# Patient Record
Sex: FEMALE | Race: AMERICAN INDIAN OR ALASKA NATIVE
[De-identification: names, ages, dates, MRNs, and addresses within clinical notes are randomized per-mention and may not be internally consistent; named-entity substitution may affect disease eponyms.]

---

## 2018-06-02 ENCOUNTER — HOSPITAL ENCOUNTER (EMERGENCY)
Dept: HOSPITAL 14 - H.ER | Age: 43
Discharge: HOME | End: 2018-06-02
Payer: COMMERCIAL

## 2018-06-02 VITALS
DIASTOLIC BLOOD PRESSURE: 75 MMHG | SYSTOLIC BLOOD PRESSURE: 118 MMHG | OXYGEN SATURATION: 100 % | TEMPERATURE: 97 F | HEART RATE: 69 BPM

## 2018-06-02 VITALS — BODY MASS INDEX: 23.7 KG/M2

## 2018-06-02 DIAGNOSIS — N72: Primary | ICD-10-CM

## 2018-06-02 DIAGNOSIS — D25.9: ICD-10-CM

## 2018-06-02 DIAGNOSIS — N89.8: ICD-10-CM

## 2018-06-02 NOTE — ED PDOC
HPI: Female  Pain


Time Seen by Provider: 06/02/18 08:00


Chief Complaint (Nursing): Female Genitourinary


Chief Complaint (Provider): Female Genitourinary


History Per: Patient


History/Exam Limitations: no limitations


Onset/Duration Of Symptoms: Days (x7)


Current Symptoms Are (Timing): Still Present


Quality Of Discomfort: Pressure


Additional Complaint(s): 





43 year old female with no significant past medical history presents to the ED 

with clear vaginal discharge onset one week. She also reports lower abdominal 

pressure due to fibroids. Patient states last sexual activity was a year ago. 

She denies vaginal bleeding, dysuria, hematuria or any other medical complaints.








PMD:  none provided


Abnormal Vaginal Bleeding: No





Past Medical History


Reviewed: Historical Data, Nursing Documentation, Vital Signs


Vital Signs: 





 Last Vital Signs











Temp  97 F L  06/02/18 07:36


 


Pulse  69   06/02/18 07:36


 


Resp      


 


BP  118/75   06/02/18 07:36


 


Pulse Ox  100   06/02/18 07:36














- Medical History


PMH: No Chronic Diseases





- Surgical History


Surgical History: No Surg Hx





- Family History


Family History: States: Unknown Family Hx





- Home Medications


Home Medications: 


 Ambulatory Orders











 Medication  Instructions  Recorded


 


Gemifloxacin Mesylate [Factive] 320 mg PO DAILY #1 tablet 06/02/18














- Allergies


Allergies/Adverse Reactions: 


 Allergies











Allergy/AdvReac Type Severity Reaction Status Date / Time


 


No Known Allergies Allergy   Verified 06/02/18 07:57














Review of Systems


ROS Statement: Except As Marked, All Systems Reviewed And Found Negative


Gastrointestinal: Positive for: Other (lower abdominal pressure)


Genitourinary Female: Positive for: Vaginal Discharge (clear), Other.  Negative 

for: Dysuria, Hematuria, Vaginal Bleeding





Physical Exam





- Reviewed


Nursing Documentation Reviewed: Yes


Vital Signs Reviewed: Yes





- Physical Exam


Appears: Positive for: Non-toxic, No Acute Distress


Head Exam: Positive for: ATRAUMATIC, NORMOCEPHALIC


Skin: Positive for: Normal Color, Warm, Dry


Eye Exam: Positive for: EOMI, Normal appearance, PERRL


Neck: Positive for: Normal, Painless ROM


Cardiovascular/Chest: Positive for: Regular Rate, Rhythm


Respiratory: Positive for: Normal Breath Sounds.  Negative for: Respiratory 

Distress


Pelvic Exam: Positive for: External Exam Normal, Discharge (thick clear d/c on 

vaginal walls), Mass (suprapubic mass consistent with fibroids), Tender W/

Cervical Motion.  Negative for: No Cerv. Motion Tender, Blood, Lesions


Extremity: Positive for: Normal ROM (upper and lower extremities)


Neurologic/Psych: Positive for: Alert, Oriented (x3)





- ECG


O2 Sat by Pulse Oximetry: 100 (RA)


Pulse Ox Interpretation: Normal





Medical Decision Making


Medical Decision Making: 


Time: 


Initial Impression: Physiologic discharge


Differential diagnosis include but are not limited to cervicitis


Initial Plan: 


--Urine Preg


--Urine dip


--Chlamydia/GC RNA, TMA


--Rocephin 250 mg IM


--Zithromax 500 mg PO 


--Genital Culture











--------------------------------------------------------------------------------

-----------------


Scribe Attestation:


Documented by Genie Thomason, acting as a scribe for Krista Ku MD 





Provider Scribe Attestation:


All medical record entries made by the Scribe were at my direction and 

personally dictated by me. I have reviewed the chart and agree that the record 

accurately reflects my personal performance of the history, physical exam, 

medical decision making, and the department course for this patient. I have 

also personally directed, reviewed, and agree with the discharge instructions 

and disposition.





Disposition





- Clinical Impression


Clinical Impression: 


 Cervicitis








- Disposition


Disposition: Routine/Home


Disposition Time: 09:40


Condition: STABLE


Additional Instructions: 


FOLLOW-UP WITH OB-GYN WITHIN 2 DAYS FOR REEVALUATION.


Prescriptions: 


Gemifloxacin Mesylate [Factive] 320 mg PO DAILY #1 tablet


Instructions:  Vaginal Discharge in Adults


Forms:  [facility] (English)

## 2018-09-30 ENCOUNTER — HOSPITAL ENCOUNTER (EMERGENCY)
Dept: HOSPITAL 14 - H.ER | Age: 43
Discharge: HOME | End: 2018-09-30
Payer: COMMERCIAL

## 2018-09-30 VITALS
TEMPERATURE: 97.7 F | OXYGEN SATURATION: 100 % | DIASTOLIC BLOOD PRESSURE: 72 MMHG | HEART RATE: 67 BPM | SYSTOLIC BLOOD PRESSURE: 108 MMHG | RESPIRATION RATE: 18 BRPM

## 2018-09-30 VITALS — BODY MASS INDEX: 23.1 KG/M2

## 2018-09-30 DIAGNOSIS — N76.0: Primary | ICD-10-CM

## 2018-09-30 NOTE — ED PDOC
HPI: Female  Pain


Chief Complaint (Provider): Vaginal itching


History Per: Patient


Additional Complaint(s): 





44 yo female, no PMH, presents to ED for evaluation of vaginal itching and 

discharge x 1 week. Pt denies any recent history of unprotected sex, no concern 

for STDs. 





<Dora Bonilla - Last Filed: 09/30/18 11:01>





<Ela Zhou - Last Filed: 09/30/18 19:19>


Time Seen by Provider: 09/30/18 09:58


Chief Complaint (Nursing): Female Genitourinary





Supervising Attending Note





- Supervising Attending Note


The Documented history was done by the: Physician Extender


The documented physical exam was done by the: Physician Extender


The documented procedures were done by the: Physician Extender





- Attestation:


I have personally seen and examined this patient.: No


I have fully participated in the care of the patient.: No


I have reviewed all pertinent clinical information, including history, physical 

exam and plan: Yes





<Ela Zhou - Last Filed: 09/30/18 19:19>





Past Medical History


Reviewed: Nursing Documentation, Vital Signs


Vital Signs: 





                                Last Vital Signs











Temp  97.7 F   09/30/18 09:17


 


Pulse  67   09/30/18 09:17


 


Resp  18   09/30/18 09:17


 


BP  108/72   09/30/18 09:17


 


Pulse Ox  100   09/30/18 09:17














- Medical History


PMH: No Chronic Diseases





- Surgical History


Surgical History: No Surg Hx





- Family History


Family History: States: Unknown Family Hx





- Living Arrangements


Living Arrangements: With Family





- Social History


Current smoker - smoking cessation education provided: No


Alcohol: Social


Drugs: Denies





<Dora Bonilla - Last Filed: 09/30/18 11:01>


Vital Signs: 





                                Last Vital Signs











Temp  97.7 F   09/30/18 09:17


 


Pulse  67   09/30/18 09:17


 


Resp  18   09/30/18 09:17


 


BP  108/72   09/30/18 09:17


 


Pulse Ox  100   09/30/18 11:04














<Ela Zhou - Last Filed: 09/30/18 19:19>





- Home Medications


Home Medications: 


                                Ambulatory Orders











 Medication  Instructions  Recorded


 


RX: Gemifloxacin Mesylate [Factive] 320 mg PO DAILY #1 tablet 06/02/18


 


Metronidazole [Metrogel-Vaginal] 1 ea VG HS #5 gel 09/30/18














- Allergies


Allergies/Adverse Reactions: 


                                    Allergies











Allergy/AdvReac Type Severity Reaction Status Date / Time


 


No Known Allergies Allergy   Verified 06/02/18 07:57














Review of Systems


ROS Statement: Except As Marked, All Systems Reviewed And Found Negative


Genitourinary Female: Positive for: Vaginal Discharge





<Dora Bonilla - Last Filed: 09/30/18 11:01>





Physical Exam





- Reviewed


Nursing Documentation Reviewed: Yes


Vital Signs Reviewed: Yes





- Physical Exam


Appears: Positive for: Well, Non-toxic, No Acute Distress


Head Exam: Positive for: ATRAUMATIC, NORMAL INSPECTION, NORMOCEPHALIC


Skin: Positive for: Normal Color, Warm, DRY


Eye Exam: Positive for: EOMI, Normal appearance, PERRL


ENT: Positive for: Normal ENT Inspection


Neck: Positive for: Normal, Painless ROM


Cardiovascular/Chest: Positive for: Regular Rate, Rhythm


Respiratory: Positive for: CNT, Normal Breath Sounds


Gastrointestinal/Abdominal: Positive for: Normal Exam, Soft


Pelvic Exam: Positive for: External Exam Normal, Speculum Exam Normal, Other 

(thin white vaginal discharge)


Back: Positive for: Normal Inspection


Extremity: Positive for: Normal ROM


Neurologic/Psych: Positive for: Alert, Oriented





<Dora Bonilla - Last Filed: 09/30/18 11:01>





- ECG


O2 Sat by Pulse Oximetry: 100





<Dora Bonilla Last Filed: 09/30/18 11:01>





Medical Decision Making


Medical Decision Making: 





Pt educated on physical exam findings and advised Metrogel x 5 nights








Pt declined STD testing when offered. Advised OB follow up





<Dora Bonilla - Last Filed: 09/30/18 11:01>





Disposition





- Patient ED Disposition


Is Patient to be Admitted: No





- Disposition


Disposition: Routine/Home


Disposition Time: 11:20





<Dora Bonilla Last Filed: 09/30/18 11:01>





<Ela Zhou - Last Filed: 09/30/18 19:19>





- Clinical Impression


Clinical Impression: 


 Vaginitis








- Disposition


Condition: STABLE


Prescriptions: 


Metronidazole [Metrogel-Vaginal] 1 ea VG HS #5 gel


Instructions:  Bacterial Vaginosis


Forms:  [facility] (English)